# Patient Record
Sex: FEMALE | Race: WHITE | HISPANIC OR LATINO | URBAN - METROPOLITAN AREA
[De-identification: names, ages, dates, MRNs, and addresses within clinical notes are randomized per-mention and may not be internally consistent; named-entity substitution may affect disease eponyms.]

---

## 2021-09-03 ENCOUNTER — EMERGENCY (EMERGENCY)
Facility: HOSPITAL | Age: 31
LOS: 1 days | Discharge: HOME | End: 2021-09-03
Admitting: EMERGENCY MEDICINE
Payer: MEDICAID

## 2021-09-03 VITALS
RESPIRATION RATE: 16 BRPM | TEMPERATURE: 98 F | WEIGHT: 175.05 LBS | HEIGHT: 66 IN | DIASTOLIC BLOOD PRESSURE: 69 MMHG | OXYGEN SATURATION: 97 % | SYSTOLIC BLOOD PRESSURE: 101 MMHG | HEART RATE: 86 BPM
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
## 2021-09-03 DIAGNOSIS — M25.572 PAIN IN LEFT ANKLE AND JOINTS OF LEFT FOOT: ICD-10-CM

## 2021-09-03 DIAGNOSIS — S82.435A NONDISPLACED OBLIQUE FRACTURE OF SHAFT OF LEFT FIBULA, INITIAL ENCOUNTER FOR CLOSED FRACTURE: ICD-10-CM
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
## 2021-09-03 PROCEDURE — 99284 EMERGENCY DEPT VISIT MOD MDM: CPT

## 2021-09-03 PROCEDURE — 73610 X-RAY EXAM OF ANKLE: CPT | Mod: 26,LT

## 2021-09-03 RX ORDER — IBUPROFEN 200 MG
600 TABLET ORAL ONCE
Refills: 0 | Status: COMPLETED | OUTPATIENT
Start: 2021-09-03 | End: 2021-09-03

## 2021-09-03 RX ADMIN — Medication 600 MILLIGRAM(S): at 00:32

## 2021-09-03 NOTE — ED PROVIDER NOTE - CLINICAL SUMMARY MEDICAL DECISION MAKING FREE TEXT BOX
pt presents with left lateral ankle pain after mechanical trip and fall. no other associated injuries at this time. NVID. ttp and swelling noted to left lateral maleolous. xray shows minimally displaced fracture of distal fibula. placed in CAM walking boot. WBAT. offered percocet and declined. offered ortho referral but pt works in [employer] and will find ortho there.

## 2021-09-03 NOTE — ED ADULT NURSE NOTE - OBJECTIVE STATEMENT
30y female presents to ED c/o L ankle pain. States stepped into a hole in the street and "felt ankle roll".

## 2021-09-03 NOTE — ED ADULT TRIAGE NOTE - CHIEF COMPLAINT QUOTE
Pt presents c/o left ankle pain 2ndary to trip and fall.  No head injury, no LOC.  Unable to bear weight or ambulate.

## 2021-09-03 NOTE — ED PROVIDER NOTE - PATIENT PORTAL LINK FT
You can access the FollowMyHealth Patient Portal offered by [facility] by registering at the following website: [email or web address]. By joining [employer] [email or web address]’s FollowMyHealth portal, you will also be able to view your health information using other applications (apps) compatible with our system.

## 2021-09-03 NOTE — ED PROVIDER NOTE - PHYSICAL EXAMINATION
Constitutional: Well appearing, awake, alert, oriented to person, place, time/situation and in no apparent distress.  HEENT: Airway patent, NCAT  Resp: no conversational dyspnea, tachypnea, or signs of respiratory distress  Msk: + left lateral ankle tenderness over the maleolous, swelling noted, skin intact without tenting, 2+ dp/pt pulses equal bilat, sensations intact distally, compartments soft, no other tenderness to foot, tibia, tibial plateau, knee. no tenderness to right knee, normal AROM of right knee, skin intact.   Neuro: A&Ox3

## 2021-09-03 NOTE — ED PROVIDER NOTE - NSFOLLOWUPINSTRUCTIONS_ED_ALL_ED_FT
Fracture    A fracture is a break in one of your bones. This can occur from a variety of injuries, especially traumatic ones. Symptoms include pain, bruising, or swelling. Do not use the injured limb. If a fracture is in one of the bones below your waist, do not put weight on that limb unless instructed to do so by your healthcare provider. Crutches or a cane may have been provided. A splint or cast may have been applied by your health care provider. Make sure to keep it dry and follow up with an orthopedist as instructed.    SEEK IMMEDIATE MEDICAL CARE IF YOU HAVE ANY OF THE FOLLOWING SYMPTOMS: numbness, tingling, increasing pain, or weakness in any part of the injured limb.     YOU HAVE A FRACTURE OF THE OUTSIDE OF THE ANKLE WITH MINIMAL DISPLACEMENT AT THIS TIME. PLEASE WEAR THE BOOT AT ALL TIMES TO KEEP THE FRACTURE SUPPORTED AND PREVENT MOVEMENT OF THE FRACTURE. OKAY TO REMOVE WHILE IN SHOWER BUT DO NOT BEAR ANY WEIGHT WHILE IT IS OFF. OKAY TO WEIGHT BEAR AS TOLERATED WHILE WEARING THE BOOT.     FOLLOW UP WITH AN ORTHOPEDIST IN 1 WEEK. CALL TOMORROW TO SCHEDULE AN APPOINTMENT WITH YOUR ORTHOPEDIST. TAKE THE DISC WITH THE XRAY WITH YOU TO YOUR APPOINTMENT.     TAKE 650MG TYLENOL EVERY 6 HOURS AS NEEDED FOR PAIN.     TAKE IBUPROFEN 600MG EVERY 6 HOURS WITH FOOD AS NEEDED FOR PAIN.

## 2021-09-03 NOTE — ED PROVIDER NOTE - OBJECTIVE STATEMENT
31yo F presents with c/o left ankle pain after twisting her ankle on a pothole while leaving her job as a . pt denies numbness, tingling, weakness. no meds taken pta. notes pain worse to alteral aspect of the ankle and worse with movement of the ankle. also notes she had mild discomfort of her right knee after the fall but it has since resolved. no prior injury to the left ankle. denies hitting her head when she fell or any other injuries.

## 2021-09-03 NOTE — ED ADULT NURSE NOTE - CAS ELECT INFOMATION PROVIDED
Vaccine Information Statement(s) for was given today. This has been reviewed, questions answered, and verbal consent given by Patient for injection(s) and administration of Pneumococcal Conjugate (PCV13).        Patient tolerated without incident. See immunization grid for documentation.     DC instructions